# Patient Record
Sex: MALE | Employment: UNEMPLOYED | URBAN - NONMETROPOLITAN AREA
[De-identification: names, ages, dates, MRNs, and addresses within clinical notes are randomized per-mention and may not be internally consistent; named-entity substitution may affect disease eponyms.]

---

## 2017-08-07 ENCOUNTER — NON-PROVIDER VISIT (OUTPATIENT)
Dept: URGENT CARE | Facility: PHYSICIAN GROUP | Age: 22
End: 2017-08-07

## 2017-08-07 DIAGNOSIS — Z02.1 PRE-EMPLOYMENT DRUG SCREENING: ICD-10-CM

## 2017-08-07 PROCEDURE — 8898 PR URINE 11 PANEL - SEND TO LAB: Performed by: PHYSICIAN ASSISTANT

## 2018-01-25 ENCOUNTER — NON-PROVIDER VISIT (OUTPATIENT)
Dept: URGENT CARE | Facility: PHYSICIAN GROUP | Age: 23
End: 2018-01-25

## 2018-01-25 DIAGNOSIS — Z02.1 PRE-EMPLOYMENT DRUG SCREENING: ICD-10-CM

## 2018-01-25 LAB
AMP AMPHETAMINE: NORMAL
COC COCAINE: NORMAL
INT CON NEG: NEGATIVE
INT CON POS: POSITIVE
MET METHAMPHETAMINES: NORMAL
OPI OPIATES: NORMAL
PCP PHENCYCLIDINE: NORMAL
POC DRUG COMMENT 753798-OCCUPATIONAL HEALTH: NEGATIVE
THC: NORMAL

## 2018-01-25 PROCEDURE — 80305 DRUG TEST PRSMV DIR OPT OBS: CPT | Performed by: FAMILY MEDICINE

## 2018-03-14 ENCOUNTER — OFFICE VISIT (OUTPATIENT)
Dept: URGENT CARE | Facility: PHYSICIAN GROUP | Age: 23
End: 2018-03-14
Payer: MEDICAID

## 2018-03-14 VITALS
HEART RATE: 68 BPM | DIASTOLIC BLOOD PRESSURE: 70 MMHG | BODY MASS INDEX: 22.28 KG/M2 | WEIGHT: 147 LBS | SYSTOLIC BLOOD PRESSURE: 120 MMHG | OXYGEN SATURATION: 100 % | TEMPERATURE: 98.1 F | RESPIRATION RATE: 16 BRPM | HEIGHT: 68 IN

## 2018-03-14 DIAGNOSIS — J34.2 DEVIATED SEPTUM: ICD-10-CM

## 2018-03-14 DIAGNOSIS — G44.201 ACUTE INTRACTABLE TENSION-TYPE HEADACHE: ICD-10-CM

## 2018-03-14 PROCEDURE — 99203 OFFICE O/P NEW LOW 30 MIN: CPT | Performed by: PHYSICIAN ASSISTANT

## 2018-03-14 RX ORDER — KETOROLAC TROMETHAMINE 30 MG/ML
60 INJECTION, SOLUTION INTRAMUSCULAR; INTRAVENOUS ONCE
Status: COMPLETED | OUTPATIENT
Start: 2018-03-14 | End: 2018-03-14

## 2018-03-14 RX ADMIN — KETOROLAC TROMETHAMINE 60 MG: 30 INJECTION, SOLUTION INTRAMUSCULAR; INTRAVENOUS at 11:14

## 2018-03-14 ASSESSMENT — ENCOUNTER SYMPTOMS
CHILLS: 0
NAUSEA: 0
DIZZINESS: 1
EYE PAIN: 0
DOUBLE VISION: 0
SHORTNESS OF BREATH: 0
VOMITING: 0
NECK PAIN: 0
COUGH: 0
BLURRED VISION: 0
SEIZURES: 0
PALPITATIONS: 0
HEADACHES: 1
SENSORY CHANGE: 0
FEVER: 0
FOCAL WEAKNESS: 0
LOSS OF CONSCIOUSNESS: 0
PHOTOPHOBIA: 1

## 2018-03-14 NOTE — PATIENT INSTRUCTIONS
"Dolor de nitin, preguntas frecuentes y russell respuestas  (Headaches, Frequently Asked Questions)  CEFALEAS MIGRAÑOSAS  P: ¿Qué es la migraña? ¿Qué la ocasiona? ¿Cómo puedo tratarla?  R: En general, la migraña comienza vandana un dolor apagado. Luego progresa hacia un dolor, narendra, punzante y vandana un latido. Sentirá mikala dolor en las sienes. Podrá sentir dolor en la parte anterior o posterior de la nitin, o en elijah o ambos lados. El dolor suele estar acompañado de joe combinación de:  · Náuseas.   · Vómitos.   · Sensibilidad a la anurag y los ruidos.   Algunas personas (un 15%) experimentan un aura (mark abajo) antes de un ataque. La causa de la migraña se debe a reacciones químicas del cerebro. El tratamiento para la migraña puede incluir medicamentos de venta yo. También puede incluir técnicas de autoayuda. Estas incluyen entrenamientos para la relajación y biorretroalimentación.   P: ¿Qué es un aura?  R: Alrededor del 15% de las personas con migraña tiene un \"aura\". Es joe señal de síntomas neurológicos que ocurren antes de un dolor de nitin por migrañas. Podrá mark líneas onduladas o irregulares, puntos o luces parpadeantes. Podrá experimentar visión de túnel o puntos ciegos en elijah o ambos ojos. El aura puede incluir alucinaciones visuales o auditivas (algo que se imagina). Puede incluir trastornos en el olfato (vandana olores extraños), el tacto o el gusto. Entre otros síntomas se incluyen:  · Adormecimiento.   · Sensación de hormigueo.   · Dificultad para recordar o decir la palabra correcta.   Estos episodios neurológicos pueden durar hasta 60 minutos. Los síntomas desaparecerán a medida que el dolor de nitin comience.  P:¿Qué es un disparador?  R: Ciertos factores físicos o ambientales pueden llevar a \"disparar\" joe migraña. Estos son:  · Alimentos.   · Cambios hormonales.   · Clima.   · Estrés.   Es importante recordar que los disparadores son diferentes entre si. Para ayudar a prevenir ataques de migrañas, " "necesitará descubrir cuáles son los disparadores que le afectan. Lleve un diario sobre russell deanna de nitin. Jane es un buen modo para descubrir los disparadores. El diario le ayudará en el momento de hablar con el profesional acerca de zaman enfermedad.  P: ¿El clima afecta en las migrañas?  R: La anurag solar, el calor, la humedad y lo cambios drásticos en la presión barométrica pueden llevar a, o \"disparar\" un ataque de migraña en algunas personas. Josias estudios jack demostrado que el clima no actúa vandana disparador para todas las personas con migraña.  P: ¿Cuál es la relación entre la migraña y la hormonas?  R: Las hormonas inician y regulan muchas de las funciones corporales. Las hormonas mantienen el balance en el cuerpo dentro de los constantes cambios de ambiente. Algunas veces, el nivel de hormonas en el cuerpo se desbalancea. Por ejemplo, raisa la menstruación, el embarazo o la menopausia. Pueden ser la causa de un ataque de migraña. De hecho, alrededor de deana cuartos de las mujeres con migraña informan que russell ataques están relacionados con el ciclo menstrual.   P: ¿Aumenta el riesgo de sufrir un choque cardíaco en las personas que padecen migraña?  R: La probabilidad de que un ataque de migraña ocasione un ataque cardíaco es muy remota. [place] no quiere decir que joe persona que sufre de migraña no pueda tener un ataque cardíaco asociado con debbie. En las personas menores de 40 años, el factor más común para un ataque es la migraña. Josias raisa la edvin de joe persona, la ocurrencia de un dolor de nitin por migraña está asociada con joe reducción en el riesgo de morir por un ataque cerebrovascular.   P: ¿Cuáles son los medicamentos para la migraña?  R: La medicación precisa se utiliza para tratar el dolor de nitin joe vez que ha comenzado. Son ejemplos, medicamentos de venta yo, desinflamatorios sin esteroides, ergotamínicos y triptanos.   P: ¿Qué son los triptanos?  R: Lo triptanos son joe nueva clase de " "medicamentos abortivos. Son específicos para tratar mikala problema. Los triptanos son vasoconstrictores. Moderan algunas reacciones químicas del cerebro. Los triptanos trabajan vandana receptores del cerebro. Ayudan a restaurar el balance de un neurotransmisor denominado serotonina. Se dago que las fluctuaciones en los niveles de serotonina son la causa principal de la migraña.   P: ¿Son efectivos los medicamentos de venta yo para la migraña?  R: Los medicamentos de venta yo pueden ser efectivos para aliviar deanna leves a moderados y los síntomas asociados a la migraña. Josias deberá consultar a un médico antes de comenzar cualquier tratamiento para la migraña.   P: ¿Cuáles son los medicamentos de prevención de la migraña?  R: Se suele denominar tratamiento \"profiláctico\" a los medicamentos para la prevención de la migraña. Se utilizan para reducir la frecuencia, gravedad y duración de los ataques de migraña. Son ejemplos de medicamentos de prevención: antiepilépticos, antidepresivos, bloqueadores beta, bloqueadores de los sheikh de calcio y medicamentos antiinflamatorios sin esteroides.  P: ¿ Por qué se utilizan anticonvulsivantes para tratar la migraña?  R: Marjan los últimos años, ha habido un creciente interés en las drogas antiepilépticas para la prevención de la migraña. A menudo se los conoce vandana \"anticonvulsivantes\". La epilepsia y la migraña suceden por reacciones similares en el cerebro.   P: ¿ Por qué se utilizan antidepresivos para tratar la migraña?  R: Los antidepresivos típicamente se utilizan para tratar a las personas con depresión. Pueden reducir la frecuencia de la migraña a través de la regulación de los niveles químicos, vandana la serotonina, en el cerebro.   P: ¿ Por qué se utilizan terapias alternativas para tratar la migraña?  R: El término \"terapias alternativas\" suelen utilizarse para describir los tratamientos que se considera que están por fuera de alcance la medicina occidental " "convencional. Son ejemplos de las terapias alternativas: la acupuntura, la acupresión y el yoga. Otra terapia alternativa común es la terapia herbal. Se dago que algunas hierbas ayudan a aliviar los deanna de perico. Siempre consulte con el profesional acerca de las terapias alternativas antes de utilizarlas. Algunos productos herbales contienen arsénico y otras toxinas.  DEANNA DE PERICO POR TENSIÓN  P: ¿Qué es un dolor de perico por tensión? ¿Qué lo ocasiona? ¿Cómo puedo tratarlo?  R: Los deanna de perico por tensión ocurren al nayan. A menudo son el resultado de estrés temporario, ansiedad, fatiga o ana. Los síntomas incluyen dolor en las sienes, joe sensación vandana de tener joe head alrededor de la perico (un dolor que \"presiona\"). Los síntomas pueden incluir joe sensación de empuje, de presión y contracción de los músculos de la perico y el destiney. El dolor comienza en la frente, sienes o en la parte posterior de la perico y el destiney. El tratamiento para los deanna de perico por tensión puede incluir medicamentos de venta yo. También puede incluir técnicas de autoayuda con entrenamientos para la relajación y biorretroalimentación.  CEFALEA EN RACIMOS  P: ¿Qué es joe cefalea en racimos? ¿Qué la ocasiona? ¿Cómo puedo tratarla?  R: La cefalea en racimos nita zaman nombre debido a que los ataques vienen en grupos. El dolor aparece con poco o ningún aviso. Normalmente ocurre de un lado de la perico. Muchas veces el dolor viene acompañado de un lagrimeo u zari lynn y goteo de la nariz del mismo lado que el dolor. Se dago que la causa es joe reacción en las sustancias químicas del cerebro. Se describe vandana el betty más grave e intenso de cualquier tipo de dolor de perico. El tratamiento incluye medicamentos bajo receta y oxígeno.  CEFALEA SINUSAL  P: ¿Qué es joe cefalea sinusal? ¿Qué la ocasiona? ¿Cómo puedo tratarla?  R: Cuando se inflama joe cavidad en los huesos de la leela y el cráneo (sinus) ocasiona un dolor " "localizado. Esta enfermedad generalmente es el resultado de joe reacción alérgica, un tumor o joe infección. Si el dolor de perico está ocasionado por un bloqueo del sinus, vandana joe infección, probablemente tendrá fiebre. Joe imagen de joselin X confirmará el bloqueo del sinus. El tratamiento indicado por el médico podrá incluir antibióticos para la infección, y también antihistamínicos o descongestivos.   DOLOR DE PERICO POR EFECTO \"REBOTE\"  P: ¿Qué es un dolor de perico por efecto \"rebote\"? ¿Qué lo ocasiona? ¿Cómo puedo tratarlo?  R: Si se rudolph medicamentos para el dolor de perico muy a menudo puede llevar a la enfermedad conocida vandana \"dolor de perico por rebote\". Un patrón de abuso de medicamentos para el dolor de perico supone tomarlos más de dos veces por semana o en cantidades excesivas. [place] significa más que lo que indica el envase o el médico. Con los deanna de perico por rebote, los medicamentos no sólo alex de aliviar el dolor sino que además comienzan a ocasionar deanna de perico. Los médicos tratan los deanna de perico por rebote mediante la disminución del medicamento del que se ha abusado. A veces el medico podrá sustituir gradualmente por un tipo diferente de tratamiento o medicación. Dejar de consumirlo podría ser difícil. El abuso regular de un medicamento aumenta el potencial que se produzcan efectos secundarios graves. Consulte con un médico si utiliza regularmente medicamentos para el dolor de perico más de dos días por semana o más de lo que indica el envase.  PREGUNTAS Y RESPUESTAS ADICIONALES  P: ¿Qué es la biorretroalimentación?  R: La biorretroalimentación es un tratamiento de autoayuda. La biorretroalimentación utiliza un equipamiento especial para controlar los movimientos involuntarios del cuerpo y las respuestas físicas. La biorretroalimentación controla:  · Respiración.   · Pulso.   · Latidos cardíacos.   · Temperatura.   · Tensión muscular.   · Actividad cerebrales.   La " biorretroalimentación le ayudará a mejorar y perfeccionar russell ejercicios de relajación. Aprenderá a controlar las respuestas físicas relacionadas con el estrés. Joe vez que se dominan las técnicas no necesitará más el equipamiento.  P: ¿Son hereditarios los deanna de nitin?  R: Según algunas estimaciones, aproximadamente 28 millones de estadounidenses sufren migraña. Cuatro de cada juan carlos (80%) informan joe historia familiar de migraña. Los investigadores no pueden asegurar si se trata de un problema genético o joe predisposición familiar. A pesar de esto, un hal tiene 50% de probabilidades de sufrir migraña si elijah de russell padres la sufre. El hal tiene un 75% de probabilidades si ambos padres la sufren.   P. ¿Puede un hal tener migraña?  R: En el momento de ingresar a la escuela secundaria, la mayoría de los jóvenes jack experimentado algún tipo de cefalea. Algunos abordajes o medicamentos seguros y efectivos pueden evitar las cefaleas o detenerlas luego de que jack comenzado.   P. ¿Qué tipo de especialista debe mark para diagnosticar y tratar joe cefalea?  R: Comience con zaman médico de cabecera. [place] acerca de zaman experiencia y abordaje de las cefaleas. Comente los métodos de clasificación, diagnóstico y tratamiento. El profesional decidirá si lo derivará a un especialista, según los síntomas u otras enfermedades. El hecho de sufrir diabetes, alergias, etc, puede requerir un abordaje más complejo. La [employer] ([employer] para las Cefaleas) proporcionará, a pedido, joe lista de los médicos que son miembros de zaman estado.  Document Released: 11/30/2009 Document Revised: 03/11/2013  [employer]® Patient Information ©2013 [employer], [employer].
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
## 2018-03-14 NOTE — LETTER
March 14, 2018         Patient: Natan Dhaliwal   YOB: 1995   Date of Visit: 3/14/2018           To Whom it May Concern:    Natan Dhaliwal was seen in my clinic on 3/14/2018. Please excuse him from work on this day.  If you have any questions or concerns, please don't hesitate to call.        Sincerely,           Asad Bailey P.A.-C.  Electronically Signed

## 2018-05-14 ENCOUNTER — NON-PROVIDER VISIT (OUTPATIENT)
Dept: URGENT CARE | Facility: PHYSICIAN GROUP | Age: 23
End: 2018-05-14

## 2018-05-14 DIAGNOSIS — Z02.1 PRE-EMPLOYMENT DRUG SCREENING: ICD-10-CM

## 2018-05-14 PROCEDURE — 8898 PR URINE 11 PANEL - SEND TO LAB: Performed by: PHYSICIAN ASSISTANT

## 2021-12-30 ENCOUNTER — APPOINTMENT (OUTPATIENT)
Dept: RADIOLOGY | Facility: MEDICAL CENTER | Age: 26
End: 2021-12-30
Attending: EMERGENCY MEDICINE

## 2021-12-30 ENCOUNTER — HOSPITAL ENCOUNTER (EMERGENCY)
Facility: MEDICAL CENTER | Age: 26
End: 2021-12-30
Attending: EMERGENCY MEDICINE | Admitting: EMERGENCY MEDICINE

## 2021-12-30 VITALS
RESPIRATION RATE: 18 BRPM | HEIGHT: 69 IN | OXYGEN SATURATION: 99 % | DIASTOLIC BLOOD PRESSURE: 77 MMHG | BODY MASS INDEX: 21.48 KG/M2 | HEART RATE: 94 BPM | SYSTOLIC BLOOD PRESSURE: 131 MMHG | TEMPERATURE: 98.1 F | WEIGHT: 145 LBS

## 2021-12-30 DIAGNOSIS — S02.2XXA CLOSED FRACTURE OF NASAL BONE, INITIAL ENCOUNTER: ICD-10-CM

## 2021-12-30 DIAGNOSIS — S01.21XA LACERATION OF NOSE, INITIAL ENCOUNTER: ICD-10-CM
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
## 2021-12-30 LAB
ANION GAP SERPL CALC-SCNC: 11 MMOL/L (ref 7–16)
BASOPHILS # BLD AUTO: 0.3 % (ref 0–1.8)
BASOPHILS # BLD: 0.04 K/UL (ref 0–0.12)
BUN SERPL-MCNC: 10 MG/DL (ref 8–22)
CALCIUM SERPL-MCNC: 9.2 MG/DL (ref 8.5–10.5)
CHLORIDE SERPL-SCNC: 105 MMOL/L (ref 96–112)
CO2 SERPL-SCNC: 23 MMOL/L (ref 20–33)
CREAT SERPL-MCNC: 0.93 MG/DL (ref 0.5–1.4)
EOSINOPHIL # BLD AUTO: 0.01 K/UL (ref 0–0.51)
EOSINOPHIL NFR BLD: 0.1 % (ref 0–6.9)
ERYTHROCYTE [DISTWIDTH] IN BLOOD BY AUTOMATED COUNT: 41.8 FL (ref 35.9–50)
GLUCOSE SERPL-MCNC: 111 MG/DL (ref 65–99)
HCT VFR BLD AUTO: 46.1 % (ref 42–52)
HGB BLD-MCNC: 16.1 G/DL (ref 14–18)
IMM GRANULOCYTES # BLD AUTO: 0.05 K/UL (ref 0–0.11)
IMM GRANULOCYTES NFR BLD AUTO: 0.4 % (ref 0–0.9)
LYMPHOCYTES # BLD AUTO: 1.23 K/UL (ref 1–4.8)
LYMPHOCYTES NFR BLD: 10.1 % (ref 22–41)
MCH RBC QN AUTO: 30.1 PG (ref 27–33)
MCHC RBC AUTO-ENTMCNC: 34.9 G/DL (ref 33.7–35.3)
MCV RBC AUTO: 86.3 FL (ref 81.4–97.8)
MONOCYTES # BLD AUTO: 0.98 K/UL (ref 0–0.85)
MONOCYTES NFR BLD AUTO: 8.1 % (ref 0–13.4)
NEUTROPHILS # BLD AUTO: 9.82 K/UL (ref 1.82–7.42)
NEUTROPHILS NFR BLD: 81 % (ref 44–72)
NRBC # BLD AUTO: 0 K/UL
NRBC BLD-RTO: 0 /100 WBC
PLATELET # BLD AUTO: 205 K/UL (ref 164–446)
PMV BLD AUTO: 9 FL (ref 9–12.9)
POTASSIUM SERPL-SCNC: 3.7 MMOL/L (ref 3.6–5.5)
RBC # BLD AUTO: 5.34 M/UL (ref 4.7–6.1)
SODIUM SERPL-SCNC: 139 MMOL/L (ref 135–145)
WBC # BLD AUTO: 12.1 K/UL (ref 4.8–10.8)

## 2021-12-30 PROCEDURE — 90471 IMMUNIZATION ADMIN: CPT

## 2021-12-30 PROCEDURE — 70486 CT MAXILLOFACIAL W/O DYE: CPT

## 2021-12-30 PROCEDURE — 700117 HCHG RX CONTRAST REV CODE 255: Performed by: EMERGENCY MEDICINE

## 2021-12-30 PROCEDURE — 304999 HCHG REPAIR-SIMPLE/INTERMED LEVEL 1

## 2021-12-30 PROCEDURE — 90715 TDAP VACCINE 7 YRS/> IM: CPT | Performed by: EMERGENCY MEDICINE

## 2021-12-30 PROCEDURE — 80048 BASIC METABOLIC PNL TOTAL CA: CPT

## 2021-12-30 PROCEDURE — 96375 TX/PRO/DX INJ NEW DRUG ADDON: CPT

## 2021-12-30 PROCEDURE — 99284 EMERGENCY DEPT VISIT MOD MDM: CPT

## 2021-12-30 PROCEDURE — 303747 HCHG EXTRA SUTURE

## 2021-12-30 PROCEDURE — 96374 THER/PROPH/DIAG INJ IV PUSH: CPT

## 2021-12-30 PROCEDURE — 700111 HCHG RX REV CODE 636 W/ 250 OVERRIDE (IP): Performed by: EMERGENCY MEDICINE

## 2021-12-30 PROCEDURE — 85025 COMPLETE CBC W/AUTO DIFF WBC: CPT

## 2021-12-30 PROCEDURE — 70498 CT ANGIOGRAPHY NECK: CPT

## 2021-12-30 RX ORDER — KETOROLAC TROMETHAMINE 30 MG/ML
15 INJECTION, SOLUTION INTRAMUSCULAR; INTRAVENOUS ONCE
Status: COMPLETED | OUTPATIENT
Start: 2021-12-30 | End: 2021-12-30

## 2021-12-30 RX ADMIN — FENTANYL CITRATE 50 MCG: 50 INJECTION, SOLUTION INTRAMUSCULAR; INTRAVENOUS at 14:45

## 2021-12-30 RX ADMIN — KETOROLAC TROMETHAMINE 15 MG: 30 INJECTION, SOLUTION INTRAMUSCULAR at 14:45

## 2021-12-30 RX ADMIN — IOHEXOL 70 ML: 350 INJECTION, SOLUTION INTRAVENOUS at 15:41

## 2021-12-30 RX ADMIN — CLOSTRIDIUM TETANI TOXOID ANTIGEN (FORMALDEHYDE INACTIVATED), CORYNEBACTERIUM DIPHTHERIAE TOXOID ANTIGEN (FORMALDEHYDE INACTIVATED), BORDETELLA PERTUSSIS TOXOID ANTIGEN (GLUTARALDEHYDE INACTIVATED), BORDETELLA PERTUSSIS FILAMENTOUS HEMAGGLUTININ ANTIGEN (FORMALDEHYDE INACTIVATED), BORDETELLA PERTUSSIS PERTACTIN ANTIGEN, AND BORDETELLA PERTUSSIS FIMBRIAE 2/3 ANTIGEN 0.5 ML: 5; 2; 2.5; 5; 3; 5 INJECTION, SUSPENSION INTRAMUSCULAR at 15:57

## 2021-12-30 ASSESSMENT — LIFESTYLE VARIABLES
EVER FELT BAD OR GUILTY ABOUT YOUR DRINKING: NO
TOTAL SCORE: 0
HAVE PEOPLE ANNOYED YOU BY CRITICIZING YOUR DRINKING: NO
CONSUMPTION TOTAL: INCOMPLETE
TOTAL SCORE: 0
DO YOU DRINK ALCOHOL: NO
HAVE YOU EVER FELT YOU SHOULD CUT DOWN ON YOUR DRINKING: NO
TOTAL SCORE: 0
DOES PATIENT WANT TO STOP DRINKING: NO
EVER HAD A DRINK FIRST THING IN THE MORNING TO STEADY YOUR NERVES TO GET RID OF A HANGOVER: NO
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
## 2021-12-31 NOTE — DISCHARGE INSTRUCTIONS
Keep your laceration area dry until tomorrow night. Trim the edges of the bandage as it curls up. Don't pull it off. Allow it to peel off on its own. Follow up with the resources provided here by Social Work.    You have new and old nasal bone fractures. If you want to consider having them straightened, you can follow up with the ear nose and throat specialist listed here.    Tranexamic Acid Counseling:  Patient advised of the small risk of bleeding problems with tranexamic acid. They were also instructed to call if they developed any nausea, vomiting or diarrhea. All of the patient's questions and concerns were addressed.